# Patient Record
Sex: FEMALE | Race: WHITE | NOT HISPANIC OR LATINO | URBAN - METROPOLITAN AREA
[De-identification: names, ages, dates, MRNs, and addresses within clinical notes are randomized per-mention and may not be internally consistent; named-entity substitution may affect disease eponyms.]

---

## 2022-08-17 ENCOUNTER — EMERGENCY (EMERGENCY)
Age: 22
LOS: 1 days | Discharge: HOME | End: 2022-08-17
Attending: STUDENT IN AN ORGANIZED HEALTH CARE EDUCATION/TRAINING PROGRAM | Admitting: STUDENT IN AN ORGANIZED HEALTH CARE EDUCATION/TRAINING PROGRAM

## 2022-08-17 VITALS
RESPIRATION RATE: 16 BRPM | OXYGEN SATURATION: 100 % | HEART RATE: 69 BPM | SYSTOLIC BLOOD PRESSURE: 103 MMHG | DIASTOLIC BLOOD PRESSURE: 62 MMHG

## 2022-08-17 VITALS
WEIGHT: 156.2 LBS | TEMPERATURE: 98 F | HEART RATE: 94 BPM | RESPIRATION RATE: 18 BRPM | OXYGEN SATURATION: 97 % | SYSTOLIC BLOOD PRESSURE: 116 MMHG | DIASTOLIC BLOOD PRESSURE: 78 MMHG

## 2022-08-17 LAB
ALBUMIN SERPL ELPH-MCNC: 4.4 G/DL — SIGNIFICANT CHANGE UP (ref 3.3–5)
ALP SERPL-CCNC: 116 U/L — SIGNIFICANT CHANGE UP (ref 40–120)
ALT FLD-CCNC: 7 U/L — SIGNIFICANT CHANGE UP (ref 4–33)
ANION GAP SERPL CALC-SCNC: 13 MMOL/L — SIGNIFICANT CHANGE UP (ref 7–14)
ANISOCYTOSIS BLD QL: SLIGHT — SIGNIFICANT CHANGE UP
AST SERPL-CCNC: 12 U/L — SIGNIFICANT CHANGE UP (ref 4–32)
BASOPHILS # BLD AUTO: 0.14 K/UL — SIGNIFICANT CHANGE UP (ref 0–0.2)
BASOPHILS NFR BLD AUTO: 1.8 % — SIGNIFICANT CHANGE UP (ref 0–2)
BILIRUB SERPL-MCNC: 0.3 MG/DL — SIGNIFICANT CHANGE UP (ref 0.2–1.2)
BUN SERPL-MCNC: 10 MG/DL — SIGNIFICANT CHANGE UP (ref 7–23)
CALCIUM SERPL-MCNC: 9.5 MG/DL — SIGNIFICANT CHANGE UP (ref 8.4–10.5)
CHLORIDE SERPL-SCNC: 99 MMOL/L — SIGNIFICANT CHANGE UP (ref 98–107)
CO2 SERPL-SCNC: 27 MMOL/L — SIGNIFICANT CHANGE UP (ref 22–31)
CREAT SERPL-MCNC: 0.83 MG/DL — SIGNIFICANT CHANGE UP (ref 0.5–1.3)
EGFR: 103 ML/MIN/1.73M2 — SIGNIFICANT CHANGE UP
EOSINOPHIL # BLD AUTO: 0.21 K/UL — SIGNIFICANT CHANGE UP (ref 0–0.5)
EOSINOPHIL NFR BLD AUTO: 2.7 % — SIGNIFICANT CHANGE UP (ref 0–6)
GIANT PLATELETS BLD QL SMEAR: PRESENT — SIGNIFICANT CHANGE UP
GLUCOSE SERPL-MCNC: 101 MG/DL — HIGH (ref 70–99)
HCT VFR BLD CALC: 39.5 % — SIGNIFICANT CHANGE UP (ref 34.5–45)
HGB BLD-MCNC: 13 G/DL — SIGNIFICANT CHANGE UP (ref 11.5–15.5)
IANC: 5.57 K/UL — SIGNIFICANT CHANGE UP (ref 1.8–7.4)
LYMPHOCYTES # BLD AUTO: 1.55 K/UL — SIGNIFICANT CHANGE UP (ref 1–3.3)
LYMPHOCYTES # BLD AUTO: 19.6 % — SIGNIFICANT CHANGE UP (ref 13–44)
MAGNESIUM SERPL-MCNC: 2.1 MG/DL — SIGNIFICANT CHANGE UP (ref 1.6–2.6)
MANUAL SMEAR VERIFICATION: SIGNIFICANT CHANGE UP
MCHC RBC-ENTMCNC: 24.5 PG — LOW (ref 27–34)
MCHC RBC-ENTMCNC: 32.9 GM/DL — SIGNIFICANT CHANGE UP (ref 32–36)
MCV RBC AUTO: 74.5 FL — LOW (ref 80–100)
MONOCYTES # BLD AUTO: 0.21 K/UL — SIGNIFICANT CHANGE UP (ref 0–0.9)
MONOCYTES NFR BLD AUTO: 2.7 % — SIGNIFICANT CHANGE UP (ref 2–14)
NEUTROPHILS # BLD AUTO: 5.36 K/UL — SIGNIFICANT CHANGE UP (ref 1.8–7.4)
NEUTROPHILS NFR BLD AUTO: 67.8 % — SIGNIFICANT CHANGE UP (ref 43–77)
PHOSPHATE SERPL-MCNC: 4.7 MG/DL — HIGH (ref 2.5–4.5)
PLAT MORPH BLD: NORMAL — SIGNIFICANT CHANGE UP
PLATELET # BLD AUTO: 341 K/UL — SIGNIFICANT CHANGE UP (ref 150–400)
PLATELET COUNT - ESTIMATE: NORMAL — SIGNIFICANT CHANGE UP
POTASSIUM SERPL-MCNC: 4 MMOL/L — SIGNIFICANT CHANGE UP (ref 3.5–5.3)
POTASSIUM SERPL-SCNC: 4 MMOL/L — SIGNIFICANT CHANGE UP (ref 3.5–5.3)
PROT SERPL-MCNC: 7.9 G/DL — SIGNIFICANT CHANGE UP (ref 6–8.3)
RBC # BLD: 5.3 M/UL — HIGH (ref 3.8–5.2)
RBC # FLD: 14.4 % — SIGNIFICANT CHANGE UP (ref 10.3–14.5)
RBC BLD AUTO: NORMAL — SIGNIFICANT CHANGE UP
SODIUM SERPL-SCNC: 139 MMOL/L — SIGNIFICANT CHANGE UP (ref 135–145)
TROPONIN T, HIGH SENSITIVITY RESULT: <6 NG/L — SIGNIFICANT CHANGE UP
TSH SERPL-MCNC: 1.61 UIU/ML — SIGNIFICANT CHANGE UP (ref 0.27–4.2)
VARIANT LYMPHS # BLD: 5.4 % — SIGNIFICANT CHANGE UP (ref 0–6)
WBC # BLD: 7.91 K/UL — SIGNIFICANT CHANGE UP (ref 3.8–10.5)
WBC # FLD AUTO: 7.91 K/UL — SIGNIFICANT CHANGE UP (ref 3.8–10.5)

## 2022-08-17 PROCEDURE — 93010 ELECTROCARDIOGRAM REPORT: CPT

## 2022-08-17 PROCEDURE — 71046 X-RAY EXAM CHEST 2 VIEWS: CPT | Mod: 26

## 2022-08-17 PROCEDURE — 99285 EMERGENCY DEPT VISIT HI MDM: CPT | Mod: 25

## 2022-08-17 RX ORDER — LIDOCAINE 4 G/100G
1 CREAM TOPICAL ONCE
Refills: 0 | Status: COMPLETED | OUTPATIENT
Start: 2022-08-17 | End: 2022-08-17

## 2022-08-17 RX ORDER — ACETAMINOPHEN 500 MG
650 TABLET ORAL ONCE
Refills: 0 | Status: COMPLETED | OUTPATIENT
Start: 2022-08-17 | End: 2022-08-17

## 2022-08-17 RX ADMIN — Medication 650 MILLIGRAM(S): at 11:39

## 2022-08-17 RX ADMIN — LIDOCAINE 1 PATCH: 4 CREAM TOPICAL at 11:45

## 2022-08-17 NOTE — ED PEDIATRIC TRIAGE NOTE - CHIEF COMPLAINT QUOTE
pt states "I have been having a lot of chest pain, like a needle is stabbing me, my heart feels like it will beat fast, when I touch my chest it hurts, this has happened before but it goes away" pt alert, anxious, BCR, no PMH, IUTD

## 2022-08-17 NOTE — ED ADULT NURSE NOTE - OBJECTIVE STATEMENT
23 y/o presenting to room 10C. Patient AAOx4, ambulatory at baseline. Patient coming to ER complaining of left sided chest pain that radiates to left arm. Patient states she started feeling numbness and tingling in the left arm and that is what caused her to come to the ER. Patient denies medication use or treatment for any medical issues at this time. Patient noted to be breathing even and unlabored. No pallor or diaphoresis noted at this time. Denies headache and dizziness at this time. #20g placed to [facility]. Labs drawn and sent as per  MD order. Patient medicated as per MD order. Awaiting x-ray.

## 2022-08-17 NOTE — ED STATDOCS - OBJECTIVE STATEMENT
23 y/o F presenting with chest pain. Has had this pain before as well, is pointing to R mid chest wall. Pain started yesterday.   I performed a medical screening examination and determined this patient to be medically stable. VSS, heart and lung exam did not reveal concerns for immediate intervention. EKG done and NSR.   Will transfer to the [facility] adult ED for further care. Spoke with Dr. Kern prior to transfer.   TRANG Brandt MD PEM Attending

## 2022-08-17 NOTE — ED PROVIDER NOTE - PATIENT PORTAL LINK FT
You can access the FollowMyHealth Patient Portal offered by [facility] by registering at the following website: [email or web address]. By joining [employer]’s FollowMyHealth portal, you will also be able to view your health information using other applications (apps) compatible with our system.

## 2022-08-17 NOTE — ED PROVIDER NOTE - PROGRESS NOTE DETAILS
-Samantha Kern D.O: symptoms resolved, CXR with normal lungs, labs wnl. Pt has apt with PMD tomorrow.  Discussed strict return precautions and follow-up instructions. Patient is agreeable with plan, addressed all questions and concerns at this time.

## 2022-08-17 NOTE — ED ADULT TRIAGE NOTE - CHIEF COMPLAINT QUOTE
Pt c/o chest tightness, sob and left arm tingling x several months worsening over past few days. Denies PMH

## 2022-08-17 NOTE — ED PROVIDER NOTE - PHYSICAL EXAMINATION
Physical Exam:  Gen: NAD, AOx3, non-toxic appearing, able to ambulate without assistance  HEENT: normal conjunctiva, tongue midline, oral mucosa moist  Lung: CTAB, no respiratory distress, no wheezes/rhonchi/rales B/L, speaking in full sentences  CV: RRR, no murmurs, rubs or gallops  Abd: soft, NT, ND, no guarding  MSK: +tenderness to palpation over anterior chest wall over ribs 2-5, no visible deformities, ROM normal in UE/LE, no back pain  Neuro: No focal sensory or motor deficits  Skin: Warm, well perfused, no rash, no leg swelling  Psych: normal affect, calm  Samantha Kern D.O.

## 2022-08-17 NOTE — ED PROVIDER NOTE - NSFOLLOWUPINSTRUCTIONS_ED_ALL_ED_FT
- Lab and imaging results, if performed, were discussed with you along with your discharge diagnosis    - Follow up with your doctor at your scheduled appointment tomorrow     - Return to the ED for any new, worsening, or concerning symptoms to you including worsening chest pain, fever, dizziness, or difficulty breathing     - Take ibuprofen/tylenol as directed as needed for pain  To control your pain at home, you should take Ibuprofen 400 mg along with Tylenol 650mg-1000mg every 6 to 8 hours. Limit your maximum daily Tylenol from all sources to 4000mg. Be aware that many other medications contain acetaminophen which is also known as Tylenol. Taking Tylenol and Ibuprofen together has been shown to be more effective at relieving pain than taking them separately. These are both over the counter medications that you can  at your local pharmacy without a prescription. You need to respect all of the warnings on the bottles. You shouldn’t take these medications for more than a week without following up with your doctor. Both medications come with certain risks and side effects that you need to discuss with your doctor, especially if you are taking them for a prolonged period.    -You can also purchase over the counter Salonpas, which does not require a prescription. Use as directed.     - Rest and keep yourself hydrated with fluids

## 2022-08-17 NOTE — ED PROVIDER NOTE - OBJECTIVE STATEMENT
22y F w/ no significant PMHx presenting with left anterior chest discomfort and intermittent numbness to L 1st-3rd digits along with intermittent feeling of palpitations x2 days. Patient states she has typically felt a "needle-like stabbing" to her left anterior chest wall that has occurred over past few months, lasts seconds and self-resolves, states she has "never thought anything of it", but then yesterday noted some pressure-like pain that has been more constant. Pain is non-pleuritic, non-exertional with no associated dizziness, neck pain, sob, nausea, vomiting or diaphoresis. Pain is not positional in nature. Has not taken anything at home for her pain. Denies OCP use, hx of DVT/PE, family hx or recent travel. Denies trauma to chest wall. States she has been increasingly anxious due to her symptoms. Denies fever, chills, visual changes, headache, back pain.

## 2022-08-17 NOTE — ED PROVIDER NOTE - NS ED ROS FT
CONSTITUTIONAL: No fevers, no chills, no lightheadedness, no dizziness  EYES: no visual changes, no eye pain  EARS: no ear drainage, no ear pain, no change in hearing  NOSE: no nasal congestion  MOUTH/THROAT: no sore throat  CV: +palpitations  RESP: No SOB, no cough  GI: No n/v/d, no abd pain  : no dysuria, no hematuria, no flank pain  MSK: +chest wall pain, no back pain, no extremity pain  SKIN: no rashes  NEURO: no headache, no focal weakness, no decreased sensation/paresthesias   PSYCHIATRIC: no known mental health issues

## 2022-08-17 NOTE — ED PROVIDER NOTE - CLINICAL SUMMARY MEDICAL DECISION MAKING FREE TEXT BOX
22y F w/ no significant PMHx presenting with left anterior chest discomfort and intermittent numbness to L 1st-3rd digits along with intermittent feeling of palpitations x2 days. VS WNL, EKG without acute ST/T wave abnormalities, +tenderness reproducible on palpation to anterior chest wall, lungs CTAB, no rash noted to anterior chest, numbness reproducible with neck side-bending and compression, distal pulses 2+ and equal. Patient PERCs out, do not clinically suspect PE, will obtain trop, basics w/ mg/phos, CXR, tylenol and lidocaine patch and reassess.

## 2024-06-04 ENCOUNTER — EMERGENCY (EMERGENCY)
Facility: HOSPITAL | Age: 24
LOS: 1 days | Discharge: HOME | End: 2024-06-04
Admitting: EMERGENCY MEDICINE
Payer: MEDICAID

## 2024-06-04 VITALS
OXYGEN SATURATION: 99 % | HEART RATE: 130 BPM | RESPIRATION RATE: 18 BRPM | DIASTOLIC BLOOD PRESSURE: 93 MMHG | SYSTOLIC BLOOD PRESSURE: 142 MMHG | HEIGHT: 63 IN | TEMPERATURE: 98 F | WEIGHT: 149.91 LBS

## 2024-06-04 DIAGNOSIS — F43.0 ACUTE STRESS REACTION: ICD-10-CM

## 2024-06-04 LAB
ADD ON TEST-SPECIMEN IN LAB: SIGNIFICANT CHANGE UP
ALBUMIN SERPL ELPH-MCNC: 4.4 G/DL — SIGNIFICANT CHANGE UP (ref 3.3–5)
ALP SERPL-CCNC: 83 U/L — SIGNIFICANT CHANGE UP (ref 40–120)
ALT FLD-CCNC: 8 U/L — SIGNIFICANT CHANGE UP (ref 4–33)
AMPHET UR-MCNC: POSITIVE
ANION GAP SERPL CALC-SCNC: 15 MMOL/L — HIGH (ref 7–14)
ANISOCYTOSIS BLD QL: SLIGHT — SIGNIFICANT CHANGE UP
APAP SERPL-MCNC: <10 UG/ML — LOW (ref 15–25)
APPEARANCE UR: CLEAR — SIGNIFICANT CHANGE UP
AST SERPL-CCNC: 17 U/L — SIGNIFICANT CHANGE UP (ref 4–32)
BARBITURATES UR SCN-MCNC: NEGATIVE — SIGNIFICANT CHANGE UP
BASOPHILS # BLD AUTO: 0 K/UL — SIGNIFICANT CHANGE UP (ref 0–0.2)
BASOPHILS NFR BLD AUTO: 0 % — SIGNIFICANT CHANGE UP (ref 0–2)
BENZODIAZ UR-MCNC: NEGATIVE — SIGNIFICANT CHANGE UP
BILIRUB SERPL-MCNC: 0.5 MG/DL — SIGNIFICANT CHANGE UP (ref 0.2–1.2)
BILIRUB UR-MCNC: NEGATIVE — SIGNIFICANT CHANGE UP
BUN SERPL-MCNC: 7 MG/DL — SIGNIFICANT CHANGE UP (ref 7–23)
CALCIUM SERPL-MCNC: 9.1 MG/DL — SIGNIFICANT CHANGE UP (ref 8.4–10.5)
CHLORIDE SERPL-SCNC: 99 MMOL/L — SIGNIFICANT CHANGE UP (ref 98–107)
CO2 SERPL-SCNC: 22 MMOL/L — SIGNIFICANT CHANGE UP (ref 22–31)
COCAINE METAB.OTHER UR-MCNC: NEGATIVE — SIGNIFICANT CHANGE UP
COLOR SPEC: YELLOW — SIGNIFICANT CHANGE UP
CREAT SERPL-MCNC: 0.9 MG/DL — SIGNIFICANT CHANGE UP (ref 0.5–1.3)
CREATININE URINE RESULT, DAU: 137 MG/DL — SIGNIFICANT CHANGE UP
DIFF PNL FLD: NEGATIVE — SIGNIFICANT CHANGE UP
EGFR: 93 ML/MIN/1.73M2 — SIGNIFICANT CHANGE UP
EOSINOPHIL # BLD AUTO: 0 K/UL — SIGNIFICANT CHANGE UP (ref 0–0.5)
EOSINOPHIL NFR BLD AUTO: 0 % — SIGNIFICANT CHANGE UP (ref 0–6)
ETHANOL SERPL-MCNC: <10 MG/DL — SIGNIFICANT CHANGE UP
FENTANYL UR QL SCN: NEGATIVE — SIGNIFICANT CHANGE UP
GLUCOSE SERPL-MCNC: 103 MG/DL — HIGH (ref 70–99)
GLUCOSE UR QL: NEGATIVE MG/DL — SIGNIFICANT CHANGE UP
HCG SERPL-ACNC: <1 MIU/ML — SIGNIFICANT CHANGE UP
HCT VFR BLD CALC: 36.8 % — SIGNIFICANT CHANGE UP (ref 34.5–45)
HGB BLD-MCNC: 11.9 G/DL — SIGNIFICANT CHANGE UP (ref 11.5–15.5)
HYPOCHROMIA BLD QL: SLIGHT — SIGNIFICANT CHANGE UP
IANC: 10.32 K/UL — HIGH (ref 1.8–7.4)
KETONES UR-MCNC: ABNORMAL MG/DL
LEUKOCYTE ESTERASE UR-ACNC: NEGATIVE — SIGNIFICANT CHANGE UP
LYMPHOCYTES # BLD AUTO: 0.62 K/UL — LOW (ref 1–3.3)
LYMPHOCYTES # BLD AUTO: 5.2 % — LOW (ref 13–44)
MCHC RBC-ENTMCNC: 22.5 PG — LOW (ref 27–34)
MCHC RBC-ENTMCNC: 32.3 GM/DL — SIGNIFICANT CHANGE UP (ref 32–36)
MCV RBC AUTO: 69.7 FL — LOW (ref 80–100)
METHADONE UR-MCNC: NEGATIVE — SIGNIFICANT CHANGE UP
MICROCYTES BLD QL: SIGNIFICANT CHANGE UP
MONOCYTES # BLD AUTO: 0.11 K/UL — SIGNIFICANT CHANGE UP (ref 0–0.9)
MONOCYTES NFR BLD AUTO: 0.9 % — LOW (ref 2–14)
NEUTROPHILS # BLD AUTO: 11.05 K/UL — HIGH (ref 1.8–7.4)
NEUTROPHILS NFR BLD AUTO: 92.2 % — HIGH (ref 43–77)
NITRITE UR-MCNC: NEGATIVE — SIGNIFICANT CHANGE UP
OPIATES UR-MCNC: NEGATIVE — SIGNIFICANT CHANGE UP
OVALOCYTES BLD QL SMEAR: SLIGHT — SIGNIFICANT CHANGE UP
OXYCODONE UR-MCNC: NEGATIVE — SIGNIFICANT CHANGE UP
PCP SPEC-MCNC: SIGNIFICANT CHANGE UP
PCP UR-MCNC: NEGATIVE — SIGNIFICANT CHANGE UP
PH UR: 6 — SIGNIFICANT CHANGE UP (ref 5–8)
PLAT MORPH BLD: NORMAL — SIGNIFICANT CHANGE UP
PLATELET # BLD AUTO: 302 K/UL — SIGNIFICANT CHANGE UP (ref 150–400)
PLATELET COUNT - ESTIMATE: NORMAL — SIGNIFICANT CHANGE UP
POIKILOCYTOSIS BLD QL AUTO: SIGNIFICANT CHANGE UP
POLYCHROMASIA BLD QL SMEAR: SLIGHT — SIGNIFICANT CHANGE UP
POTASSIUM SERPL-MCNC: 3.5 MMOL/L — SIGNIFICANT CHANGE UP (ref 3.5–5.3)
POTASSIUM SERPL-SCNC: 3.5 MMOL/L — SIGNIFICANT CHANGE UP (ref 3.5–5.3)
PROT SERPL-MCNC: 8 G/DL — SIGNIFICANT CHANGE UP (ref 6–8.3)
PROT UR-MCNC: NEGATIVE MG/DL — SIGNIFICANT CHANGE UP
RBC # BLD: 5.28 M/UL — HIGH (ref 3.8–5.2)
RBC # FLD: 15.8 % — HIGH (ref 10.3–14.5)
RBC BLD AUTO: ABNORMAL
SALICYLATES SERPL-MCNC: <0.3 MG/DL — LOW (ref 15–30)
SARS-COV-2 RNA SPEC QL NAA+PROBE: SIGNIFICANT CHANGE UP
SCHISTOCYTES BLD QL AUTO: SLIGHT — SIGNIFICANT CHANGE UP
SODIUM SERPL-SCNC: 136 MMOL/L — SIGNIFICANT CHANGE UP (ref 135–145)
SP GR SPEC: 1.01 — SIGNIFICANT CHANGE UP (ref 1–1.03)
THC UR QL: NEGATIVE — SIGNIFICANT CHANGE UP
TOXICOLOGY SCREEN, DRUGS OF ABUSE, SERUM RESULT: SIGNIFICANT CHANGE UP
TSH SERPL-MCNC: 5.94 UIU/ML — HIGH (ref 0.27–4.2)
UROBILINOGEN FLD QL: 0.2 MG/DL — SIGNIFICANT CHANGE UP (ref 0.2–1)
VARIANT LYMPHS # BLD: 1.7 % — SIGNIFICANT CHANGE UP (ref 0–6)
WBC # BLD: 11.98 K/UL — HIGH (ref 3.8–10.5)
WBC # FLD AUTO: 11.98 K/UL — HIGH (ref 3.8–10.5)

## 2024-06-04 PROCEDURE — 99285 EMERGENCY DEPT VISIT HI MDM: CPT

## 2024-06-04 RX ORDER — HALOPERIDOL DECANOATE 100 MG/ML
5 INJECTION INTRAMUSCULAR ONCE
Refills: 0 | Status: COMPLETED | OUTPATIENT
Start: 2024-06-04 | End: 2024-06-04

## 2024-06-04 RX ADMIN — Medication 2 MILLIGRAM(S): at 17:54

## 2024-06-04 RX ADMIN — HALOPERIDOL DECANOATE 5 MILLIGRAM(S): 100 INJECTION INTRAMUSCULAR at 17:54

## 2024-06-04 NOTE — ED ADULT TRIAGE NOTE - CHIEF COMPLAINT QUOTE
pt brought in by EMS for bizarre behavior. pt states she got bit by a spider. pt yelling in triage. pt brought to .
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
## 2024-06-04 NOTE — ED BEHAVIORAL HEALTH ASSESSMENT NOTE - DESCRIPTION
arrived agitated  ICU Vital Signs Last 24 Hrs  T(C): 36.4 (04 Jun 2024 17:28), Max: 36.4 (04 Jun 2024 17:28)  T(F): 97.6 (04 Jun 2024 17:28), Max: 97.6 (04 Jun 2024 17:28)  HR: 130 (04 Jun 2024 17:28) (130 - 130)  BP: 142/93 (04 Jun 2024 17:28) (142/93 - 142/93)  BP(mean): --  ABP: --  ABP(mean): --  RR: 18 (04 Jun 2024 17:28) (18 - 18)  SpO2: 99% (04 Jun 2024 17:28) (99% - 99%)    O2 Parameters below as of 04 Jun 2024 17:28  Patient On (Oxygen Delivery Method): room air anemia unemployed, not in school, unemployed, not in school, no reported access to guns

## 2024-06-04 NOTE — ED BEHAVIORAL HEALTH ASSESSMENT NOTE - NS ED BHA MED ROS PSYCHIATRIC
1 15 21 REVIEWED RISK OF BILATERAL INFECTION WITH BILATERAL INJECTION - PT WANTS TO PROCEED WITH BILATERAL INJECTION. See HPI

## 2024-06-04 NOTE — ED ADULT TRIAGE NOTE - CCCP TRG CHIEF CMPLNT
Problem: Falls - Risk of  Goal: *Absence of Falls  Description: Document Bard Mackenzie Fall Risk and appropriate interventions in the flowsheet. Outcome: Progressing Towards Goal  Note: Fall Risk Interventions:            Medication Interventions: Bed/chair exit alarm    Elimination Interventions: Call light in reach, Patient to call for help with toileting needs    History of Falls Interventions: Bed/chair exit alarm         Problem: Patient Education: Go to Patient Education Activity  Goal: Patient/Family Education  Outcome: Progressing Towards Goal     Problem: Pain  Goal: *Control of Pain  Outcome: Progressing Towards Goal     Problem: Patient Education: Go to Patient Education Activity  Goal: Patient/Family Education  Outcome: Progressing Towards Goal     Problem: Pressure Injury - Risk of  Goal: *Prevention of pressure injury  Description: Document Leander Scale and appropriate interventions in the flowsheet.   Outcome: Progressing Towards Goal  Note: Pressure Injury Interventions:  Sensory Interventions: Assess changes in LOC, Check visual cues for pain, Keep linens dry and wrinkle-free, Minimize linen layers, Pressure redistribution bed/mattress (bed type)    Moisture Interventions: Absorbent underpads, Internal/External urinary devices, Minimize layers    Activity Interventions: Pressure redistribution bed/mattress(bed type)    Mobility Interventions: HOB 30 degrees or less    Nutrition Interventions: Document food/fluid/supplement intake    Friction and Shear Interventions: Foam dressings/transparent film/skin sealants, HOB 30 degrees or less, Apply protective barrier, creams and emollients                Problem: Patient Education: Go to Patient Education Activity  Goal: Patient/Family Education  Outcome: Progressing Towards Goal psychiatric evaluation

## 2024-06-04 NOTE — ED BEHAVIORAL HEALTH ASSESSMENT NOTE - SUMMARY
Patient is a 24 year old, female; domicile with family; single; noncaregiver; unemployed;  PPH of per psyckes Major Depressive Disorder * Generalized Anxiety Disorder * Unspecified/Other Anxiety Disorder * Panic Disorder * Persistent Depressive Disorder * PTSD * Unspecified/Other Bipolar * Unspecified/Other Depressive Disorder; one previous hospitalizations; previous suicide attempt by overdose on benzodiazepines resulting in medical admission and transferred to inpatient psych at [facility]; no known history of violence or arrests; denies active substance abuse and no known history of complicated withdrawal; PMH of toxic encephalopathy s/p overdose and iron deficiency anemia; brought in by EMS; called by mother for bizarre behavior. Upon arrival to ED patient was hyperverbal, disorganized, and pacing. Staff was not able to redirect and patient received Haldol 5 mg Ativan 2 mg.     Patient was not able to fully participate with interview due to sedation caused by medication for agitation. Patient focused on somatic symptoms which she reports is related to a spider bite. During interview patient was not making sense and appeared disorganized. Collateral from mother reports patient has been fine and has not safety concerns. Will need to reassess when patient is able to engage in interview. Patient is a 24 year old, female; domicile with family; single; noncaregiver; unemployed;  PPH of per psyckes Major Depressive Disorder * Generalized Anxiety Disorder * Unspecified/Other Anxiety Disorder * Panic Disorder * Persistent Depressive Disorder * PTSD * Unspecified/Other Bipolar * Unspecified/Other Depressive Disorder; one previous hospitalizations; previous suicide attempt by overdose on benzodiazepines resulting in medical admission and transferred to inpatient psych at [facility]; no known history of violence or arrests; denies active substance abuse and no known history of complicated withdrawal; PMH of toxic encephalopathy s/p overdose and iron deficiency anemia; brought in by EMS; called by mother for bizarre behavior. Upon arrival to ED patient was hyperverbal, disorganized, and pacing. Staff was not able to redirect and patient received Haldol 5 mg Ativan 2 mg.     Patient was not able to fully participate with interview due to sedation caused by medication for agitation. Patient focused on somatic symptoms which she reports is related to a spider bite. During interview patient was not making sense and appeared disorganized. Collateral from mother reports patient has been fine and has not safety concerns. Will need to reassess when patient is able to engage in interview.    Addendum- 2245- patient awake and cooperative with good eye contact of organized thought process. She reports she is not sure if she was actually bit by a spider today and thinks she may have accidently inhaled bug spray that she used on the spider web in her room. Patient reports her mood has been stable and is looking forward to taking her GED in a few weeks. She denies any current or recent S/H/i/I/P, and denies A/V/h, thoughts of paranoia or ideas of reference. Patient reports she wants to go home and has an appointment with her psychiatrist tomorrow and therapist in a few days. Patient maintained good eye contact, was smiling appropriately and engaging in conversation. She reports she will return to the ED if symptoms return. Collateral from family supports patient is not an imminent danger to self or others. It is unclear if patient was experiencing a panic attack vs acute stress reaction vs substance induced psychosis. Patient is a 24 year old, female; domicile with family; single; noncaregiver; unemployed;  PPH of per psyckes Major Depressive Disorder * Generalized Anxiety Disorder * Unspecified/Other Anxiety Disorder * Panic Disorder * Persistent Depressive Disorder * PTSD * Unspecified/Other Bipolar * Unspecified/Other Depressive Disorder; one previous hospitalizations; previous suicide attempt by overdose on benzodiazepines resulting in medical admission and transferred to inpatient psych at [facility]; no known history of violence or arrests; denies active substance abuse and no known history of complicated withdrawal; PMH of toxic encephalopathy s/p overdose and iron deficiency anemia; brought in by EMS; called by mother for bizarre behavior. Upon arrival to ED patient was hyperverbal, disorganized, and pacing. Staff was not able to redirect and patient received Haldol 5 mg Ativan 2 mg.     Patient was not able to fully participate with interview due to sedation caused by medication for agitation. Patient focused on somatic symptoms which she reports is related to a spider bite. During interview patient was not making sense and appeared disorganized. Collateral from mother reports patient has been fine and has not safety concerns. Will need to reassess when patient is able to engage in interview.    Addendum- 2245- patient awake and cooperative with good eye contact of organized thought process. She reports she is not sure if she was actually bit by a spider today and thinks she may have accidently inhaled bug spray that she used on the spider web in her room. Patient reports her mood has been stable and is looking forward to taking her GED in a few weeks. She denies any current or recent S/H/i/I/P, and denies A/V/h, thoughts of paranoia or ideas of reference. Patient reports she wants to go home and has an appointment with her psychiatrist tomorrow and therapist in a few days. Patient maintained good eye contact, was smiling appropriately and engaging in conversation. She reports she will return to the ED if symptoms return. Collateral from family supports patient is not an imminent danger to self or others. It is unclear if patient was experiencing a panic attack vs acute stress reaction vs substance induced psychosis. Urine positive for amphetamine. Patient prescribed dextroamphetamine.  Discussed BW with medical NP. Patient cleared for discharge.

## 2024-06-04 NOTE — ED BEHAVIORAL HEALTH NOTE - BEHAVIORAL HEALTH NOTE
As per request of provider, writer contacted pt’ mother Daisha ([phone]) to obtain collateral information w/ use of  (id#[id number]). the following information is per mother    Pt is a 25 yo female domiciled w/ mother, father and sister 23 yo , not working or studying, hx of  anxiety and depression, bib ems.    Reason for ed visit: mother says pt woke up today saying a spider bit her. She found a lot of spider webs in the room. she says that she did not see a bite but saw red area on her arm ( looked like a mosquito bite). Pt reported feeling numb and feeling palpations which lead to her calling 911.  Symptoms/Hx: no si or hi reported. No AVH reported, no paranoia or delusions as per mother. mother says pt has seemed okay. pt has been looking for word and spending times watching tv with mother. she says pt has been caring for herself at baseline and has had no safety concerns. When writer asked about pt screaming in the ed she says pt was saying that she wasn’t getting help.    Baseline: she says pt seemed at baseline prior to getting bit.     Stressors: spider bite.    Medical problems:  none reported.    Medication: compliant w/ medication. she does not know pt’s medication list.    Treatment team: has a psychologist and psychiatrist but she does not know name or contact info. She says pt has an appointment tomorrow. She reports pt has one prior admission a few months ago at [facility] because pt took the wrong medication.     Drug/alcohol use: none reported    Family hx: none reported.    Violence/aggression:  pt is not violent or aggressive.    Dispo: mother has no safety concerns and is in waiting room awaiting dispo.

## 2024-06-04 NOTE — ED BEHAVIORAL HEALTH ASSESSMENT NOTE - PAST PSYCHOTROPIC MEDICATION
Amphetamine-Dextroamphetamine 20 mg 4/10/24-5/22/2024 Clonazepam 1 mg daily 11/1/23-12/13/2024 Remeron 4/1024-5/22/24  Zoloft 200 mg daily 11/1/23-2/8/2024  Seroquel 100 mg daily 11/1/23-2/8/2024 Ativan 2 mg daily 10/5/22-3/1/23

## 2024-06-04 NOTE — ED BEHAVIORAL HEALTH ASSESSMENT NOTE - HPI (INCLUDE ILLNESS QUALITY, SEVERITY, DURATION, TIMING, CONTEXT, MODIFYING FACTORS, ASSOCIATED SIGNS AND SYMPTOMS)
Patient is a 24 year old, female; domicile with family; single; noncaregiver; unemployed;  PPH of per psyckes Major Depressive Disorder * Generalized Anxiety Disorder * Unspecified/Other Anxiety Disorder * Panic Disorder * Persistent Depressive Disorder * PTSD * Unspecified/Other Bipolar * Unspecified/Other Depressive Disorder; one previous hospitalizations; previous suicide attempt by overdose on benzodiazepines resulting in medical admission and transferred to inpatient psych at [facility]; no known history of violence or arrests; denies active substance abuse and no known history of complicated withdrawal; PMH of toxic encephalopathy s/p overdose and iron deficiency anemia; brought in by EMS; called by mother for bizarre behavior. Upon arrival to ED patient was hyperverbal, disorganized, and pacing. Staff was not able to redirect and patient received Haldol 5 mg Ativan 2 mg.     Patient drowsy and observed in room lying on bed. She reports she came to the ED because she had numbness in her chest and arms from a suspected spider bite. Patient reports she was watching TV with her mother and noticed there were two large cobwebs in her basement. Patient claims when she looked more closely she noticed 2 large black spiders in the web. She then sprayed them with an insect spray. Shortly after patient started feeling " woozy" and developed numbness in her chest which radiated to her arm. Patient mother called 911 and she was brought to the ED. Patient reports prior to this happening " everything was fine." Patient is not able to state where she was bit but knows she was because of her symptoms.   Patient reports she is in current treatment with a psychiatrist in [place]. She is not able to provide his name and reports she is compliant with medication, but is not able to state name of medication. She reports she was recently hospitalized at [facility] but was discharged the same day " because they made a mistake and I was fine." Patient reports her mood has been fine and denies and recent depressed mood or anhedonia and denies recent thoughts of hurting self or others. She also denies recent or current auditory/visual hallucinations or thoughts of paranoia.     See  note for collateral. Patient is a 24 year old, female; domicile with family; single; noncaregiver; unemployed;  PPH of per psyckes Major Depressive Disorder * Generalized Anxiety Disorder * Unspecified/Other Anxiety Disorder * Panic Disorder * Persistent Depressive Disorder * PTSD * Unspecified/Other Bipolar * Unspecified/Other Depressive Disorder; one previous hospitalizations; previous suicide attempt by overdose on benzodiazepines resulting in medical admission and transferred to inpatient psych at [facility]; no known history of violence or arrests; denies active substance abuse and no known history of complicated withdrawal; PMH of toxic encephalopathy s/p overdose and iron deficiency anemia; brought in by EMS; activated by self for medical concerns. Upon arrival to ED patient was hyperverbal, disorganized, and pacing. Staff was not able to redirect and patient received Haldol 5 mg Ativan 2 mg.     Patient drowsy and observed in room lying on bed. She reports she came to the ED because she had numbness in her chest and arms from a suspected spider bite. Patient reports she was watching TV with her mother and noticed there were two large cobwebs in her basement. Patient claims when she looked more closely and noticed 2 large black spiders in the web. She then sprayed them with an insect spray. Shortly after patient started feeling " woozy" and developed numbness in her chest which radiated to her arm. Patient called 911 and she was brought to the ED. Patient reports prior to this event " everything was fine." Patient is not able to state where she was bit but stated, " I know I was bit because of the numbness."   Patient reports she is in current treatment with a psychiatrist in [place]. She is not able to provide his name and reports she is compliant with medication, but is not able to state name of medication. She reports she was recently hospitalized at [facility] but was discharged the same day " because they made a mistake and I was fine." Patient reports her mood has been fine and denies and recent depressed mood or anhedonia and denies recent thoughts of hurting self or others. She also denies recent or current auditory/visual hallucinations or thoughts of paranoia.     See  note for collateral.

## 2024-06-04 NOTE — ED ADULT NURSE REASSESSMENT NOTE - NS ED NURSE REASSESS COMMENT FT1
Pt awake, a&ox4, calm and cooperative, even unlabored respirations observed. Pt denies current S/I H/I I/P, AVH, paranoia or delusional thinking. Psych cleared and MC for DC to home at this time w/ family via private auto. DC instructions provided.

## 2024-06-04 NOTE — ED PROVIDER NOTE - CLINICAL SUMMARY MEDICAL DECISION MAKING FREE TEXT BOX
23 yo F PMH MDD p/w increased anxiety and agitation at home. Presents as preoccupied with spider bite and receiving antidote. Not able to be redirectable. Admits she has a new psychiatrist and therapist that works for her x1 day. Now hyperventilating, repeating that she is going to die. Reports compliance with mirtazapine. Denies fever, headache, dizziness, SI/HI/AH/VH, chills, chest pain, shortness of breath, abdominal pain, sick contact or recent travel. Denies alcohol use or other drugs.   Haldol, Ativan  SW collateral  Psych consult  Dispo as per consult

## 2024-06-04 NOTE — ED PROVIDER NOTE - PSYCHIATRIC [-], MLM
Please see POC for Eval results and tx from 8/28/19    YURY Escamilla, BENIT                
no insomnia/not suicidal

## 2024-06-04 NOTE — ED PROVIDER NOTE - PATIENT PORTAL LINK FT
You can access the FollowMyHealth Patient Portal offered by [facility] by registering at the following website: [email or web address]. By joining [facility]’s FollowMyHealth portal, you will also be able to view your health information using other applications (apps) compatible with our system.

## 2024-06-04 NOTE — ED BEHAVIORAL HEALTH ASSESSMENT NOTE - OTHER
sedated- uncooperative on arrival on re-evaluation, no disorganization in speech sedated- uncooperative on arrival.. on reevaluation, more cooperative, not guarded/ not suspicious

## 2024-06-04 NOTE — ED ADULT NURSE NOTE - OBJECTIVE STATEMENT
BREAK RN: pt. received to  from ambulance bay triage brought in by EMS presenting for a psych eval. pt. endorses a spider bit her this morning and that she needs the antidote. As per EMS, pt. exhibiting erratic and bizarre behavior. Upon initial interview, pt. requesting antidote for brown spider bite, states her whole body is falling asleep, and that her heart is not beating. pt. medicated as per ESTELITA Heck's orders. pt. maintains eye contact upon interview. Denies S/I and H/I, A/H and V/H, ETOH/Drug use. Pt. belongings secured. pt. wanded for safety. pt. independently changed into  Clothing. eval on going at this time.

## 2024-06-04 NOTE — ED BEHAVIORAL HEALTH ASSESSMENT NOTE - NSBHMSETHTPROC_PSY_A_CORE
Illogical/Impaired reasoning on re-evaluation,  no overt disorganization in TP/Illogical/Impaired reasoning

## 2024-06-04 NOTE — ED BEHAVIORAL HEALTH ASSESSMENT NOTE - NSBHATTESTCOMMENTATTENDFT_PSY_A_CORE
Summary: Call placed to [phone], received busy signal.  Intervention: Will mail a failure to contact letter to the patient.  
24/F with documented hx of depression + anxiety as per psyckes; with one previous hospitalizations; had prior SA via OD on BZD; denies active substance abuse.  medical hx: toxic encephalopathy s/p overdose and iron deficiency anemia.  today, presented to the ED BIB EMS activated by mother for bizarre behavior. Upon arrival to ED, she was noted to be agitated. eventually given Haldol 5 mg IM + Ativan 2 mg IM.     on re-evaluation, noted to be more cooperative. does not exhibit any signs/ symptoms suggestive of florid psychosis despite illogicality brought forth by preoccupation focused on somatic symptoms experienced related to a recent spider bite. Pt reports she is not sure if she was actually bitten by a spider today.  thinks that she may have accidently inhaled bug spray that she used on the spider web in her room.     at this time, denied experiencing any signs/ symptoms suggestive of severe MDD; no occurrence or reported worsening of any anxiety disorder symptoms.  Pt is not suicidal or homicidal. is not manic nor psychotic at this time.  does not appear to be in withdrawal. is not delirious.  Collateral info was obtained from her family who did not raise any safety concerns for this Pt.. currently, Pt is NOT an imminent danger to self or others.     At this time, It is unclear if Pt's presentation was resultant of a panic attack vs acute stress reaction vs substance induced psychosis. Urine positive for amphetamine. Patient prescribed dextroamphetamine.   overall, no justification to pursue emergent psych admission for this Pt.  she is psych cleared for discharge

## 2024-06-04 NOTE — ED PROVIDER NOTE - NSFOLLOWUPINSTRUCTIONS_ED_ALL_ED_FT
Follow up with your PMD within 48-72 hrs. Show copies of your reports given to you.   Worsening, continued or new concerning symptoms return to the emergency department.    You have been given information necessary to follow up with the  [facility] ([facility]) Crisis center & other outpatient  psychiatric clinics within your community    • [facility] walk in Crisis centre  [address] [place] [address]  [phone] [email or web address]  Hours of operation:  Day	                                        Hours  Sunday                                  Closed  Monday                                9am - 3pm  Tuesday                                9am - 3pm  Wednesday                          9am - 3pm  Thursday                               9am - 3pm  Friday                                    9am - 3pm  Saturday                                Closed

## 2024-06-04 NOTE — ED BEHAVIORAL HEALTH ASSESSMENT NOTE - OTHER PAST PSYCHIATRIC HISTORY (INCLUDE DETAILS REGARDING ONSET, COURSE OF ILLNESS, INPATIENT/OUTPATIENT TREATMENT)
admitted to [facility] medically s/p overdose 2/22/2024  Hospitalized [facility] 2/27/2024-3/6/2024   In current treatment

## 2024-06-04 NOTE — ED BEHAVIORAL HEALTH ASSESSMENT NOTE - NSBHMSETHTCONTENT_PSY_A_CORE
Unable to assess on re-evaluation, no passive/ active SI/ HI elicited; no delusions/Unable to assess

## 2024-06-04 NOTE — ED BEHAVIORAL HEALTH ASSESSMENT NOTE - RISK ASSESSMENT
low risk- although patient has a prior suicide attempt she denies current or recent suicidal ideation, is afraid of dying, in current outpatient treatment, has supportive family and denies substance abuse.